# Patient Record
Sex: MALE | Race: WHITE | NOT HISPANIC OR LATINO | Employment: OTHER | URBAN - METROPOLITAN AREA
[De-identification: names, ages, dates, MRNs, and addresses within clinical notes are randomized per-mention and may not be internally consistent; named-entity substitution may affect disease eponyms.]

---

## 2019-01-01 ENCOUNTER — HOSPITAL ENCOUNTER (EMERGENCY)
Facility: HOSPITAL | Age: 84
End: 2019-10-23
Attending: EMERGENCY MEDICINE
Payer: MEDICARE

## 2019-01-01 VITALS — OXYGEN SATURATION: 86 % | HEART RATE: 102 BPM

## 2019-01-01 DIAGNOSIS — I46.9 CARDIAC ARREST (HCC): Primary | ICD-10-CM

## 2019-01-01 LAB — GLUCOSE SERPL-MCNC: 88 MG/DL (ref 65–140)

## 2019-01-01 PROCEDURE — 99285 EMERGENCY DEPT VISIT HI MDM: CPT

## 2019-01-01 PROCEDURE — 92950 HEART/LUNG RESUSCITATION CPR: CPT

## 2019-01-01 PROCEDURE — 82948 REAGENT STRIP/BLOOD GLUCOSE: CPT

## 2019-01-01 PROCEDURE — 99284 EMERGENCY DEPT VISIT MOD MDM: CPT | Performed by: EMERGENCY MEDICINE

## 2019-01-01 RX ORDER — EPINEPHRINE 0.1 MG/ML
5 SYRINGE (ML) INJECTION ONCE
Status: DISCONTINUED | OUTPATIENT
Start: 2019-01-01 | End: 2019-01-01 | Stop reason: HOSPADM

## 2019-01-01 RX ADMIN — EPINEPHRINE 1 MG: 0.1 INJECTION, SOLUTION ENDOTRACHEAL; INTRACARDIAC; INTRAVENOUS at 16:30

## 2019-01-01 RX ADMIN — EPINEPHRINE 1 MG: 0.1 INJECTION, SOLUTION ENDOTRACHEAL; INTRACARDIAC; INTRAVENOUS at 16:33

## 2019-10-23 NOTE — ED PROVIDER NOTES
History  Chief Complaint   Patient presents with    Cardiac Arrest     found down by son outside  CPR started 20minutes prior to arrival by medics  Patient is an 81-year-old male with history of hypertension who presents in cardiac arrest   Son found the patient collapsed outside of his home  Son states that he appeared blue and his extremities were cold  He called EMS who initiated CPR  Patient was intubated and ACLS protocol was followed for 25 minutes pre-hospital   Patient received 5 rounds of epinephrine  Patient has been in asystole since EMS arrived on scene  Son states the patient had been feeling well earlier in the day and was last seen 2 hours prior to being found in cardiac arrest       History provided by:  Relative and EMS personnel  History limited by:  Patient unresponsive  Cardiac Arrest   Witnessed by:  Not witnessed  Incident location:  Home  Time before ALS initiated:  8-10 minutes  Condition upon EMS arrival:  Unresponsive  Pulse:  Absent  Initial cardiac rhythm per EMS:  Asystole  Treatments prior to arrival:  ACLS protocol and intubation  Medications given prior to ED:  Epinephrine  Airway:  Intubation prior to arrival  Rhythm on admission to ED:  Asystole      None       No past medical history on file  No past surgical history on file  No family history on file  I have reviewed and agree with the history as documented  Social History     Tobacco Use    Smoking status: Not on file   Substance Use Topics    Alcohol use: Not on file    Drug use: Not on file        Review of Systems   Unable to perform ROS: Patient unresponsive       Physical Exam  Physical Exam   Constitutional: He appears well-developed  He is intubated  HENT:   Head: Normocephalic and atraumatic  Nose: Nose normal    Mouth/Throat: Mucous membranes are pale  ETT in place   Eyes: Right pupil is not reactive  Left pupil is not reactive     Pupils 4 mm and unreactive bilaterally   Neck: Trachea normal  Neck supple  No tracheal deviation present  Cardiovascular: Exam reveals decreased pulses  No heart sounds  Pulseless  Femoral pulse palpable with chest compressions   Pulmonary/Chest: He is intubated  Bilateral breath sounds with assisted ventilations   Abdominal: Soft  Normal appearance  He exhibits no distension  Neurological: He is unresponsive  GCS eye subscore is 1  GCS verbal subscore is 1  GCS motor subscore is 1  Skin: There is pallor  Cool extremities   Nursing note and vitals reviewed  Vital Signs  ED Triage Vitals [10/23/19 1635]   Temp Pulse Resp BP SpO2   -- 102 -- -- (!) 86 %      Temp src Heart Rate Source Patient Position - Orthostatic VS BP Location FiO2 (%)   -- Other (Comment) -- -- --      Pain Score       --           Vitals:    10/23/19 1635   Pulse: 102         Visual Acuity      ED Medications  Medications   EPINEPHrine (ADRENALIN) injection (1 mg Intravenous Given 10/23/19 1630)   EPINEPHrine (ADRENALIN) injection (1 mg Intravenous Given 10/23/19 1633)       Diagnostic Studies  Results Reviewed     Procedure Component Value Units Date/Time    Fingerstick Glucose (POCT) [[id number]]  (Normal) Collected:  10/23/19 1637    Lab Status:  Final result Updated:  10/23/19 1736     POC Glucose 88 mg/dl                  No orders to display              Procedures  Procedures       ED Course                               MDM  Number of Diagnoses or Management Options  Cardiac arrest [facility]): new and requires workup  Diagnosis management comments: Patient presents after an unwitnessed cardiac arrest   Patient was found to be pulseless and apneic by EMS  Unknown down time  EMS intubated patient and initiated ACLS protocol  Patient received 5 rounds of epinephrine and 25 minutes of chest compressions pre-hospital   He remains in asystole  Blood glucose normal   Bilateral breath sounds present with assisted ventilations    End-tidal CO2 remains less than 20 after about 35 minutes of resuscitation  Do not feel that there is a reasonable chance of meaningful neurologic recovery at this time  Resuscitation stopped and time of death called  Son notified and at bedside  Amount and/or Complexity of Data Reviewed  Clinical lab tests: ordered and reviewed  Tests in the medicine section of CPT®: ordered and reviewed  Obtain history from someone other than the patient: yes  Review and summarize past medical records: yes    Risk of Complications, Morbidity, and/or Mortality  Presenting problems: high  Diagnostic procedures: low  Management options: high    Patient Progress  Patient progress: ()      Disposition  Final diagnoses:   Cardiac arrest [facility])     Time reflects when diagnosis was documented in both MDM as applicable and the Disposition within this note     Time User Action Codes Description Comment    10/23/2019  8:44 PM Deonte Mckeon Add [I46 9] Cardiac arrest [facility])       ED Disposition     ED Disposition Condition Date/Time Comment      Wed Oct 23, 2019  4:45 PM       Follow-up Information    None       Date, Time and Cause of Death    Preliminary Cause of Death:  Sudden cardiac death due to cardiac arrhythmia       There are no discharge medications for this patient  No discharge procedures on file      ED Provider  Electronically Signed by           Stacie Crawley DO  10/24/19 7944